# Patient Record
Sex: FEMALE | Race: WHITE | NOT HISPANIC OR LATINO | Employment: UNEMPLOYED | URBAN - METROPOLITAN AREA
[De-identification: names, ages, dates, MRNs, and addresses within clinical notes are randomized per-mention and may not be internally consistent; named-entity substitution may affect disease eponyms.]

---

## 2023-09-03 PROBLEM — G43.109 MIGRAINE WITH AURA AND WITHOUT STATUS MIGRAINOSUS, NOT INTRACTABLE: Status: ACTIVE | Noted: 2023-09-03

## 2023-09-03 PROBLEM — G90.A POTS (POSTURAL ORTHOSTATIC TACHYCARDIA SYNDROME): Status: ACTIVE | Noted: 2023-09-03

## 2023-09-03 PROBLEM — M54.2 CERVICALGIA OF OCCIPITO-ATLANTO-AXIAL REGION: Status: ACTIVE | Noted: 2023-09-03

## 2023-09-03 PROBLEM — H57.89 THYROID EYE DISEASE: Status: ACTIVE | Noted: 2023-09-03

## 2023-09-03 PROBLEM — H54.61 VISION LOSS OF RIGHT EYE: Status: ACTIVE | Noted: 2023-09-03

## 2023-09-03 PROBLEM — Z86.39 H/O HASHIMOTO THYROIDITIS: Status: ACTIVE | Noted: 2023-09-03

## 2023-09-03 PROBLEM — R11.0 NAUSEA: Status: ACTIVE | Noted: 2023-09-03

## 2023-09-03 PROBLEM — E07.9 THYROID EYE DISEASE: Status: ACTIVE | Noted: 2023-09-03

## 2023-09-03 PROBLEM — F32.A DEPRESSION: Status: ACTIVE | Noted: 2023-09-03

## 2023-09-03 RX ORDER — ZOLMITRIPTAN 5 MG/1
SPRAY NASAL
COMMUNITY
Start: 2016-12-27 | End: 2024-06-03 | Stop reason: ALTCHOICE

## 2023-09-03 RX ORDER — ZOLMITRIPTAN 5 MG/1
1 TABLET, FILM COATED ORAL
COMMUNITY
Start: 2018-04-25 | End: 2024-03-04 | Stop reason: WASHOUT

## 2023-09-03 RX ORDER — SUMATRIPTAN SUCCINATE 100 MG/1
1 TABLET ORAL
COMMUNITY

## 2023-09-03 RX ORDER — BUPROPION HYDROCHLORIDE 100 MG/1
1 TABLET ORAL
COMMUNITY

## 2023-09-03 RX ORDER — ESCITALOPRAM OXALATE 10 MG/1
1 TABLET ORAL
COMMUNITY
End: 2024-03-04 | Stop reason: WASHOUT

## 2023-09-03 RX ORDER — ETONOGESTREL 68 MG/1
IMPLANT SUBCUTANEOUS
COMMUNITY
End: 2024-02-19 | Stop reason: ALTCHOICE

## 2023-09-03 RX ORDER — LEVOTHYROXINE SODIUM 100 UG/1
1 TABLET ORAL DAILY
COMMUNITY

## 2023-09-03 RX ORDER — FLUOXETINE HYDROCHLORIDE 20 MG/1
40 CAPSULE ORAL DAILY
COMMUNITY

## 2023-09-03 RX ORDER — DICLOFENAC POTASSIUM 50 MG/1
POWDER, FOR SOLUTION ORAL
COMMUNITY
Start: 2016-12-27 | End: 2024-03-04 | Stop reason: WASHOUT

## 2023-09-03 RX ORDER — CYCLOSPORINE 0.5 MG/ML
EMULSION OPHTHALMIC
COMMUNITY
End: 2024-05-22 | Stop reason: ALTCHOICE

## 2023-10-19 ENCOUNTER — APPOINTMENT (OUTPATIENT)
Dept: OPHTHALMOLOGY | Facility: CLINIC | Age: 28
End: 2023-10-19
Payer: COMMERCIAL

## 2023-11-02 ENCOUNTER — OFFICE VISIT (OUTPATIENT)
Dept: NEUROLOGY | Facility: CLINIC | Age: 28
End: 2023-11-02
Payer: COMMERCIAL

## 2023-11-02 VITALS
HEIGHT: 68 IN | WEIGHT: 126 LBS | BODY MASS INDEX: 19.1 KG/M2 | SYSTOLIC BLOOD PRESSURE: 109 MMHG | DIASTOLIC BLOOD PRESSURE: 75 MMHG | HEART RATE: 74 BPM

## 2023-11-02 DIAGNOSIS — G90.A POTS (POSTURAL ORTHOSTATIC TACHYCARDIA SYNDROME): Primary | ICD-10-CM

## 2023-11-02 PROCEDURE — 99204 OFFICE O/P NEW MOD 45 MIN: CPT | Performed by: NURSE PRACTITIONER

## 2023-11-02 PROCEDURE — 1036F TOBACCO NON-USER: CPT | Performed by: NURSE PRACTITIONER

## 2023-11-02 RX ORDER — WEIGH SCALE
1 MISCELLANEOUS MISCELLANEOUS 2 TIMES WEEKLY
Qty: 1 EACH | Refills: 0 | Status: SHIPPED | OUTPATIENT
Start: 2023-11-02 | End: 2024-06-03 | Stop reason: ALTCHOICE

## 2023-11-02 RX ORDER — NAPROXEN 500 MG/1
500 TABLET ORAL 2 TIMES DAILY PRN
COMMUNITY

## 2023-11-02 RX ORDER — SPIRONOLACTONE 100 MG/1
100 TABLET, FILM COATED ORAL DAILY
COMMUNITY

## 2023-11-02 RX ORDER — PROPRANOLOL HYDROCHLORIDE 10 MG/1
10 TABLET ORAL 2 TIMES DAILY
Qty: 30 TABLET | Refills: 1 | Status: SHIPPED | OUTPATIENT
Start: 2023-11-02 | End: 2023-12-11 | Stop reason: SDUPTHER

## 2023-11-02 ASSESSMENT — ENCOUNTER SYMPTOMS
LOSS OF SENSATION IN FEET: 0
DEPRESSION: 0
OCCASIONAL FEELINGS OF UNSTEADINESS: 0

## 2023-11-02 ASSESSMENT — PATIENT HEALTH QUESTIONNAIRE - PHQ9
1. LITTLE INTEREST OR PLEASURE IN DOING THINGS: NOT AT ALL
2. FEELING DOWN, DEPRESSED OR HOPELESS: NOT AT ALL
SUM OF ALL RESPONSES TO PHQ9 QUESTIONS 1 AND 2: 0

## 2023-11-02 NOTE — PROGRESS NOTES
"Being assessed today for initial evaluation of POTS.  The patient has been experiencing episodes of syncope with collapse, postural dizziness for almost 3 years.  She has been following with her PCP who recently had autonomic testing completed.  This did confirm a diagnosis of POTS.  Discussed \"simple measures\" patient can take to help alleviate some her for her symptoms such as staying hydrated with noncaffeinated fluids, electrolyte replacement fluids, avoiding chocolate and caffeine, eating smaller more frequent meals, if not contraindicated for any other medical condition increase salt intake, avoiding stressors, wearing knee-high compression stockings when anticipating extended periods of weightbearing activities outside of the home.  Patient to keep orthostatic diary for review at follow-up.  Patient did try midodrine in the past and was unable to tolerate it due to side effects.  Reviewed the process of obtaining this data and patient verbalized understanding.  We will try her on propranolol 10 mg daily for 7 days then increase to twice daily.  Discussed role of medicine, importance of taking medications, potential risks, benefits, and precautions to be taken.  Reviewed sleep hygiene and dietary modifications.  Follow-up in 4 to 6 weeks.    This note was created with voice recognition software and was not corrected for typographical or grammatical errors  "

## 2023-11-27 ENCOUNTER — APPOINTMENT (OUTPATIENT)
Dept: DERMATOLOGY | Facility: CLINIC | Age: 28
End: 2023-11-27
Payer: COMMERCIAL

## 2023-11-28 ENCOUNTER — APPOINTMENT (OUTPATIENT)
Dept: DERMATOLOGY | Facility: CLINIC | Age: 28
End: 2023-11-28
Payer: COMMERCIAL

## 2023-12-11 ENCOUNTER — TELEMEDICINE (OUTPATIENT)
Dept: NEUROLOGY | Facility: CLINIC | Age: 28
End: 2023-12-11
Payer: COMMERCIAL

## 2023-12-11 DIAGNOSIS — G90.A POTS (POSTURAL ORTHOSTATIC TACHYCARDIA SYNDROME): Primary | ICD-10-CM

## 2023-12-11 PROCEDURE — 99214 OFFICE O/P EST MOD 30 MIN: CPT | Performed by: NURSE PRACTITIONER

## 2023-12-11 RX ORDER — PROPRANOLOL HYDROCHLORIDE 10 MG/1
10 TABLET ORAL DAILY
Qty: 90 TABLET | Refills: 1 | Status: SHIPPED | OUTPATIENT
Start: 2023-12-11 | End: 2024-03-04 | Stop reason: ALTCHOICE

## 2023-12-11 NOTE — PROGRESS NOTES
Patient being assessed today for follow-up of POTS.  She reports that she is only taking the propranolol once a day.  With that her symptoms have significantly improved and have become more manageable.  She reports that she has decreased episodes of tachycardia and does not feel like she is going to lose consciousness as frequently as previous.  Denies side effects.  Orthostatics stable.  Would like to continue the propranolol 1 time per day as she has been taking it.  Discussed role of medicine, importance of taking medications, potential risks, benefits, and precautions to be taken.  Reviewed sleep hygiene and dietary modifications.  Follow-up in 6 months.    This note was created with voice recognition software and was not corrected for typographical or grammatical errors

## 2023-12-21 ENCOUNTER — PREP FOR PROCEDURE (OUTPATIENT)
Dept: OTOLARYNGOLOGY | Facility: CLINIC | Age: 28
End: 2023-12-21
Payer: COMMERCIAL

## 2023-12-21 ENCOUNTER — HOSPITAL ENCOUNTER (OUTPATIENT)
Facility: CLINIC | Age: 28
Setting detail: OUTPATIENT SURGERY
End: 2023-12-21
Attending: OTOLARYNGOLOGY | Admitting: OTOLARYNGOLOGY
Payer: COMMERCIAL

## 2023-12-21 DIAGNOSIS — J34.2 DEVIATED NASAL SEPTUM: ICD-10-CM

## 2023-12-21 DIAGNOSIS — J34.3 HYPERTROPHY OF NASAL TURBINATES: ICD-10-CM

## 2023-12-21 DIAGNOSIS — M95.0 ACQUIRED DEFORMITY OF NOSE: ICD-10-CM

## 2024-02-19 PROBLEM — R06.89 DIFFICULTY BREATHING: Status: ACTIVE | Noted: 2024-02-19

## 2024-02-19 PROBLEM — J34.2 DEVIATED SEPTUM: Status: ACTIVE | Noted: 2024-02-19

## 2024-02-19 PROBLEM — M95.0 NASAL VALVE COLLAPSE: Status: ACTIVE | Noted: 2024-02-19

## 2024-02-19 PROBLEM — J34.829 NASAL VALVE COLLAPSE: Status: ACTIVE | Noted: 2024-02-19

## 2024-02-19 NOTE — PROGRESS NOTES
[facility]    3/4/24: Sasha Ceballos is a 28yo female known to our office, who was previously seen September 2023 for Nasal surgery consultation.  She presents today because she has been dealing with chronic sinus infection since November 2023 despite 3 rounds of abx rx by PCP/urgent care. Would also like discuss surgical planning, possible combination with FESS.       Reason for consult: Nasal obstruction     Referring provider: Self referred, but previously seen with Dr. Mcwilliams for vision/proptosis.      Chief Complaint: Obstructed breathing     Constant, present year round, does not fluctuate. It affects the patients ability to sleep, exercise, and is troubling during the day.  Symptoms began: Lifelong      PATIENT HAD FILLER TO THE UPPER BRIDGE OF THE NOSE 3 MONTHS AGO.     Nasal steroid or spray:   Site of obstruction: L>R  Previous Otolaryngology Provider: No  Previous documentation for this patient was extensively reviewed including specific reasons for evaluation. Complex nature of complaint and medical issues prompting evaluation for surgical consideration by facial plastic & reconstructive surgeon.  Previous surgery: No  Previous CT/MRI imaging of the nasal cavity and sinuses: No  Trauma history: No  Sleep apnea or snoring history: No  Allergic rhinitis, sinusitis history: Seasonal  Smoking: No  Aesthetic concern: Yes     Past, family, and social history obtained but not pertinent to current problem other than documented in HPI:     All other systems have been reviewed and are negative for complaint.     Physical exam     General: Well-developed and well-nourished in appearance.  Skin: No rashes or concerning lesions on the visible portions of the skin.  Eyes: Extraocular movements intact. Visual fields grossly normal.  Ears: Pinna are normal in shape and position. External canals are patent.  Oral Cavity/Oropharynx: Dentition is intact. Mucous membranes moist. No masses or lesions. Tonsils are symmetric  and non-obstructive.  Neck: Midline trachea without masses or lesions. Thyroid is normal in size.  Lymphatics: No palpable cervical lymphadenopathy  Respiratory: No respiratory distress. Quiet breathing without stertor or stridor.  Cardiovascular: Regular rate and rhythm. Warm extremities with equal pulses.  Psych: Normal mood and affect. Judgement and insight appropriate.  Neuro: Alert and oriented. CN II-XII grossly intact. No focal deficits.  Musculoskeletal: Gait intact. Moves all extremities well without apparent deformities.     A comprehensive facial exam was performed with the following highlights:  Nasal skin type: Thick  External exam:  Tip: bulbous  Tip rotation: appropriate  Projection: overprojection  Dorsum:   Base width: Appropriate  Asymmetries:   Alar columellar relationship: Increased alar show bilaterally  Internal exam:   Septum: Left C-shaped deviation; Left caudal deflection  Inferior turbinates: hypertrophic BL  Nasal valve angle: Decreased BL     Intranasal examination performed with limited view on anterior rhinoscopy.     Procedures:  Procedure: Rigid diagnostic nasal endoscopy  Surgeon: Marlon Rodriguez MD  Anesthesia: None  Timeout: Performed  Findings: A 0-degree rigid endoscope was passed through the patient's bilateral naris. The first pass was along the floor of the nose to the nasopharynx. The second pass was to the area of the middle meatus. The third pass was to the sphenoethmoidal recess.     Septum: Deviation is S shaped with bilateral obstruction approximately 90% without perforations nor synechia.  Internal Nasal Valve: Angle is reduced, narrowing the nasal airway bilaterally.     Right nasal cavity:  Inferior turbinate: 2+  Inferior meatus: Clear, no discharge, no polyps/masses/lesions  Middle meatus: Clear, no discharge, no polyps/masses/lesions     Left nasal cavity:  Inferior turbinate: 2+  Inferior meatus: Clear, no discharge, no polyps/masses/lesions  Middle meatus: Clear,  no discharge, no polyps/masses/lesions  Nasopharynx: Clear, no discharge, no masses/lesions     Modified Elvira Maneuver: Performed with a cotton tipped applicator, markedly improves breathing bilaterally.    Assessment - This patient has a significant mechanical nasal obstruction. The cause is multifactorial, with an obvious septal deviation, turbinate hypertrophy, and static internal nasal valve collapse. There is some dynamic nasal valve collapse as well. Correction of this nasal obstruction will require a septoplasty, repair of nasal valve collapse with structural grafting, and a bilateral turbinate reduction. We discussed the medical necessity of this at length, as well as the risks and limitations of the procedures. All questions were answered.      Plan - Recommend septoplasty, nasal valve repair with structural grafting, and inferior turbinate reduction with lateralization. We also discussed the aesthetic concerns as well.     Persistent sinusitis symptoms despite antibiotic course x 3. Recommend CT Sinus w/o contrast to further evaluate.

## 2024-02-22 ENCOUNTER — OFFICE VISIT (OUTPATIENT)
Dept: DERMATOLOGY | Facility: CLINIC | Age: 29
End: 2024-02-22
Payer: COMMERCIAL

## 2024-02-22 DIAGNOSIS — L70.0 ACNE VULGARIS: Primary | ICD-10-CM

## 2024-02-22 DIAGNOSIS — L91.0 KELOID: ICD-10-CM

## 2024-02-22 PROCEDURE — 1036F TOBACCO NON-USER: CPT | Performed by: NURSE PRACTITIONER

## 2024-02-22 PROCEDURE — 99204 OFFICE O/P NEW MOD 45 MIN: CPT | Performed by: NURSE PRACTITIONER

## 2024-02-22 RX ORDER — BENZOYL PEROXIDE 100 MG/ML
1 LIQUID TOPICAL DAILY
Qty: 237 G | Refills: 11 | Status: SHIPPED | OUTPATIENT
Start: 2024-02-22 | End: 2024-03-04 | Stop reason: ALTCHOICE

## 2024-02-22 RX ORDER — CLINDAMYCIN PHOSPHATE 10 UG/ML
LOTION TOPICAL 2 TIMES DAILY
Qty: 60 ML | Refills: 1 | Status: SHIPPED | OUTPATIENT
Start: 2024-02-22 | End: 2024-03-04 | Stop reason: ALTCHOICE

## 2024-02-22 NOTE — PROGRESS NOTES
Subjective     Sasha Ceballos is a 29 y.o. female who presents for the following: Acne, keloid, and lesion.     New patient visit in for acne to face patient currently treating with spironolactone 100 mg and tazarotene.  Patient states that it is helpful she is been using this has routine for 1 month but wonders if there is anything else she should be using.    Patient would like to look at keloid present to chest x 10 years patient describes it as painful and borders changing patient has had in the past laser treatment and IL K injections.    Patient would like provider to take a look at new lesion on chest near keloid that is painful and present x 2 months    Review of Systems:  No other skin or systemic complaints other than what is documented elsewhere in the note.    The following portions of the chart were reviewed this encounter and updated as appropriate:       Skin Cancer History  No skin cancer on file.    Specialty Problems    None    Past Medical History:  Sasha Ceballos  has a past medical history of Cervicalgia (12/27/2016), Migraine with aura, not intractable, without status migrainosus (12/27/2016), Personal history of other diseases of the respiratory system, Personal history of other endocrine, nutritional and metabolic disease (09/15/2022), and Personal history of other specified conditions.    Past Surgical History:  Sasha Ceballos  has a past surgical history that includes Foot surgery (12/27/2016); Other surgical history (09/15/2022); CT angio head w and wo IV contrast (1/20/2023); and CT angio neck (1/20/2023).    Family History:  Patient family history includes Cancer in an other family member; Thyroid cancer in an other family member.    Social History:  Sasha Ceballos  reports that she has never smoked. She has never used smokeless tobacco. She reports that she does not drink alcohol and does not use drugs.    Allergies:  Latex and Oxycodone-acetaminophen    Current Medications / CAM's:     Current Outpatient Medications:     benzoyl peroxide (Benzac AC) 10 % external wash, Apply 1 Application topically once daily., Disp: 237 g, Rfl: 11    blood pressure test kit-small kit, 1 each 2 times a week., Disp: 1 each, Rfl: 0    buPROPion (Wellbutrin) 100 mg tablet, Take 1 tablet (100 mg) by mouth. As directed., Disp: , Rfl:     clindamycin (Cleocin T) 1 % lotion, Apply topically 2 times a day., Disp: 60 mL, Rfl: 1    cycloSPORINE (Restasis MultiDose) 0.05 % drops, Administer into affected eye(s). Take as directed., Disp: , Rfl:     diclofenac potassium (Cambia) 50 mg powder in packet, Take by mouth. 1 to2 packets prn, Disp: , Rfl:     escitalopram (Lexapro) 10 mg tablet, Take 1 tablet (10 mg) by mouth. As directed., Disp: , Rfl:     FLUoxetine (PROzac) 20 mg capsule, Take 1 capsule (20 mg) by mouth once daily., Disp: , Rfl:     levothyroxine (Synthroid, Levoxyl) 100 mcg tablet, Take 1 tablet (100 mcg) by mouth once daily., Disp: , Rfl:     naproxen (Naprosyn) 500 mg tablet, Take 1 tablet (500 mg) by mouth 2 times a day as needed for mild pain (1 - 3)., Disp: , Rfl:     propranolol (Inderal) 10 mg tablet, Take 1 tablet (10 mg) by mouth once daily., Disp: 90 tablet, Rfl: 1    spironolactone (Aldactone) 100 mg tablet, Take 1 tablet (100 mg) by mouth once daily., Disp: , Rfl:     SUMAtriptan (Imitrex) 100 mg tablet, Take 1 tablet (100 mg) by mouth. FOR MIGRAINE RELIEF. MAY REPEAT 2 HOURS LATER. MAXIMUM 200MG/DAY., Disp: , Rfl:     ZOLMitriptan (Zomig) 5 mg nasal solution, Administer into affected nostril(s). 1 to 2 sprays qd prn, Disp: , Rfl:     ZOLMitriptan (Zomig) 5 mg tablet, Take 1 tablet (5 mg) by mouth. AT ONSET OF MIGRAINE. MAY REPEAT 1 TIME AFTER 2 HOURS. MAX 10 MG PER DAY, Disp: , Rfl:      Objective   Well appearing patient in no apparent distress; mood and affect are within normal limits.    Assessment/Plan   1. Acne vulgaris  Head - Anterior (Face)  Scattered pink papules    Maintenance of Current  Regimen: Please continue using the prescribed topical medications as directed. Consistency is key to maintaining the improvement achieved so far.    PLAN:  Start BPO 10% wash daily  Start clindamycin 1% lotion  Continue spironolactone 100mg daily  Continue tazorac 0.1% cream       Further Treatment Options: If there are no significant improvements or if your acne worsens, we may consider additional treatment options such as oral medications or in-office procedures. However, it is important to note that most cases of mild acne can be managed effectively with the current regimen.    Skin Care Tips: Continue following a gentle cleansing routine, avoiding harsh scrubbing, and using non-comedogenic moisturizers to keep your skin hydrated without clogging pores.    Cleansing Routine: Gentle Cleanser: You have been using a mild, non-comedogenic cleanser to wash your face twice daily. This practice helps to remove excess oil, dirt, and impurities from your skin, minimizing the risk of pore blockage.    Lifestyle Measures: Avoiding Trigger Factors: We also discussed avoiding known triggers such as excessive sun exposure, touching or picking at the acne lesions, and using heavy or comedogenic cosmetic products.    Please feel free to contact our clinic if you have any questions or concerns between appointments. Remember, acne treatment requires patience and consistency.                 clindamycin (Cleocin T) 1 % lotion - Head - Anterior (Face)  Apply topically 2 times a day.    benzoyl peroxide (Benzac AC) 10 % external wash - Head - Anterior (Face)  Apply 1 Application topically once daily.    2. Keloid  Chest - Medial (Center)  4cm Shiny, thick, fibrotic pink-brown plaque.    Intralesional injection - Chest - Medial (Center)  Intralesional Injection:   Consent:     Consent obtained:  Verbal    Consent given by:  Patient    Risks discussed:  Poor cosmetic result, pain, infection and bleeding    Alternatives discussed:  No  treatment  Pre-Procedure Details:     Prep Type:  Isopropyl alcohol  Procedure Details:   Injection:  Triamcinolone  Outcome: patient tolerated procedure well  Post-procedure details: sterile dressing applied and wound care instructions given  Dressing type: bandage   Comments:  A total of 0.5 ml of 40 mg/mL Kenalog were injected into 1 lesion(s)

## 2024-03-04 ENCOUNTER — OFFICE VISIT (OUTPATIENT)
Dept: OTOLARYNGOLOGY | Facility: CLINIC | Age: 29
End: 2024-03-04
Payer: COMMERCIAL

## 2024-03-04 VITALS — WEIGHT: 128.4 LBS | HEIGHT: 68 IN | BODY MASS INDEX: 19.46 KG/M2

## 2024-03-04 DIAGNOSIS — J34.3 HYPERTROPHY OF NASAL TURBINATES: ICD-10-CM

## 2024-03-04 DIAGNOSIS — J01.91 ACUTE RECURRENT SINUSITIS, UNSPECIFIED LOCATION: Primary | ICD-10-CM

## 2024-03-04 DIAGNOSIS — M95.0 NASAL VALVE COLLAPSE: ICD-10-CM

## 2024-03-04 DIAGNOSIS — J34.2 DEVIATED SEPTUM: ICD-10-CM

## 2024-03-04 DIAGNOSIS — R06.89 DIFFICULTY BREATHING: ICD-10-CM

## 2024-03-04 PROCEDURE — 31231 NASAL ENDOSCOPY DX: CPT | Performed by: OTOLARYNGOLOGY

## 2024-03-04 PROCEDURE — 1036F TOBACCO NON-USER: CPT | Performed by: OTOLARYNGOLOGY

## 2024-03-04 PROCEDURE — 99213 OFFICE O/P EST LOW 20 MIN: CPT | Performed by: OTOLARYNGOLOGY

## 2024-03-04 RX ORDER — DOXYCYCLINE 100 MG/1
100 CAPSULE ORAL 2 TIMES DAILY
Qty: 28 CAPSULE | Refills: 0 | Status: SHIPPED | OUTPATIENT
Start: 2024-03-04 | End: 2024-03-04 | Stop reason: ENTERED-IN-ERROR

## 2024-03-04 ASSESSMENT — PATIENT HEALTH QUESTIONNAIRE - PHQ9
1. LITTLE INTEREST OR PLEASURE IN DOING THINGS: NOT AT ALL
2. FEELING DOWN, DEPRESSED OR HOPELESS: NOT AT ALL
SUM OF ALL RESPONSES TO PHQ9 QUESTIONS 1 & 2: 0

## 2024-03-04 ASSESSMENT — ENCOUNTER SYMPTOMS
OCCASIONAL FEELINGS OF UNSTEADINESS: 0
LOSS OF SENSATION IN FEET: 0
DEPRESSION: 0

## 2024-03-18 ENCOUNTER — HOSPITAL ENCOUNTER (OUTPATIENT)
Dept: RADIOLOGY | Facility: CLINIC | Age: 29
Discharge: HOME | End: 2024-03-18
Payer: COMMERCIAL

## 2024-03-18 DIAGNOSIS — J01.91 ACUTE RECURRENT SINUSITIS, UNSPECIFIED LOCATION: ICD-10-CM

## 2024-03-18 PROCEDURE — 70486 CT MAXILLOFACIAL W/O DYE: CPT

## 2024-03-28 ENCOUNTER — PREP FOR PROCEDURE (OUTPATIENT)
Dept: OTOLARYNGOLOGY | Facility: CLINIC | Age: 29
End: 2024-03-28
Payer: COMMERCIAL

## 2024-03-28 DIAGNOSIS — M95.0 ACQUIRED DEFORMITY OF NOSE: ICD-10-CM

## 2024-03-28 DIAGNOSIS — J34.3 HYPERTROPHY OF NASAL TURBINATES: ICD-10-CM

## 2024-03-28 DIAGNOSIS — J34.2 DEVIATED NASAL SEPTUM: ICD-10-CM

## 2024-04-04 ENCOUNTER — ANESTHESIA EVENT (OUTPATIENT)
Dept: OPERATING ROOM | Facility: CLINIC | Age: 29
End: 2024-04-04
Payer: COMMERCIAL

## 2024-04-05 ENCOUNTER — HOSPITAL ENCOUNTER (OUTPATIENT)
Facility: CLINIC | Age: 29
Setting detail: OUTPATIENT SURGERY
Discharge: HOME | End: 2024-04-05
Attending: OTOLARYNGOLOGY | Admitting: OTOLARYNGOLOGY
Payer: COMMERCIAL

## 2024-04-05 ENCOUNTER — ANESTHESIA (OUTPATIENT)
Dept: OPERATING ROOM | Facility: CLINIC | Age: 29
End: 2024-04-05
Payer: COMMERCIAL

## 2024-04-05 VITALS
WEIGHT: 128.09 LBS | OXYGEN SATURATION: 98 % | DIASTOLIC BLOOD PRESSURE: 73 MMHG | HEART RATE: 84 BPM | RESPIRATION RATE: 16 BRPM | HEIGHT: 68 IN | SYSTOLIC BLOOD PRESSURE: 119 MMHG | TEMPERATURE: 98.2 F | BODY MASS INDEX: 19.41 KG/M2

## 2024-04-05 DIAGNOSIS — R06.89 DIFFICULTY BREATHING: Primary | ICD-10-CM

## 2024-04-05 DIAGNOSIS — J34.2 DEVIATED NASAL SEPTUM: ICD-10-CM

## 2024-04-05 DIAGNOSIS — J34.3 HYPERTROPHY OF NASAL TURBINATES: ICD-10-CM

## 2024-04-05 DIAGNOSIS — M95.0 ACQUIRED DEFORMITY OF NOSE: ICD-10-CM

## 2024-04-05 DIAGNOSIS — G89.18 POSTOPERATIVE PAIN: ICD-10-CM

## 2024-04-05 LAB — PREGNANCY TEST URINE, POC: NEGATIVE

## 2024-04-05 PROCEDURE — 3700000002 HC GENERAL ANESTHESIA TIME - EACH INCREMENTAL 1 MINUTE: Mod: CSM | Performed by: OTOLARYNGOLOGY

## 2024-04-05 PROCEDURE — 81025 URINE PREGNANCY TEST: CPT | Performed by: OTOLARYNGOLOGY

## 2024-04-05 PROCEDURE — 7100000010 HC PHASE TWO TIME - EACH INCREMENTAL 1 MINUTE: Performed by: OTOLARYNGOLOGY

## 2024-04-05 PROCEDURE — COSRE: Performed by: OTOLARYNGOLOGY

## 2024-04-05 PROCEDURE — 30520 REPAIR OF NASAL SEPTUM: CPT | Performed by: OTOLARYNGOLOGY

## 2024-04-05 PROCEDURE — 30465 REPAIR NASAL STENOSIS: CPT | Performed by: OTOLARYNGOLOGY

## 2024-04-05 PROCEDURE — 2500000004 HC RX 250 GENERAL PHARMACY W/ HCPCS (ALT 636 FOR OP/ED): Performed by: OTOLARYNGOLOGY

## 2024-04-05 PROCEDURE — 2500000001 HC RX 250 WO HCPCS SELF ADMINISTERED DRUGS (ALT 637 FOR MEDICARE OP): Performed by: ANESTHESIOLOGY

## 2024-04-05 PROCEDURE — 2500000005 HC RX 250 GENERAL PHARMACY W/O HCPCS: Performed by: OTOLARYNGOLOGY

## 2024-04-05 PROCEDURE — 3700000001 HC GENERAL ANESTHESIA TIME - INITIAL BASE CHARGE: Performed by: OTOLARYNGOLOGY

## 2024-04-05 PROCEDURE — A4217 STERILE WATER/SALINE, 500 ML: HCPCS | Performed by: OTOLARYNGOLOGY

## 2024-04-05 PROCEDURE — 2500000001 HC RX 250 WO HCPCS SELF ADMINISTERED DRUGS (ALT 637 FOR MEDICARE OP): Performed by: OTOLARYNGOLOGY

## 2024-04-05 PROCEDURE — 2500000004 HC RX 250 GENERAL PHARMACY W/ HCPCS (ALT 636 FOR OP/ED)

## 2024-04-05 PROCEDURE — 3600000002 HC OR TIME - INITIAL BASE CHARGE - PROCEDURE LEVEL TWO: Performed by: OTOLARYNGOLOGY

## 2024-04-05 PROCEDURE — 2720000007 HC OR 272 NO HCPCS: Performed by: OTOLARYNGOLOGY

## 2024-04-05 PROCEDURE — 2500000001 HC RX 250 WO HCPCS SELF ADMINISTERED DRUGS (ALT 637 FOR MEDICARE OP)

## 2024-04-05 PROCEDURE — 7100000009 HC PHASE TWO TIME - INITIAL BASE CHARGE: Performed by: OTOLARYNGOLOGY

## 2024-04-05 PROCEDURE — 7100000002 HC RECOVERY ROOM TIME - EACH INCREMENTAL 1 MINUTE: Performed by: OTOLARYNGOLOGY

## 2024-04-05 PROCEDURE — 30802 ABLATE INF TURBINATE SUBMUC: CPT | Performed by: OTOLARYNGOLOGY

## 2024-04-05 PROCEDURE — 3600000007 HC OR TIME - EACH INCREMENTAL 1 MINUTE - PROCEDURE LEVEL TWO: Mod: CSM | Performed by: OTOLARYNGOLOGY

## 2024-04-05 PROCEDURE — 2500000005 HC RX 250 GENERAL PHARMACY W/O HCPCS

## 2024-04-05 PROCEDURE — 7100000001 HC RECOVERY ROOM TIME - INITIAL BASE CHARGE: Performed by: OTOLARYNGOLOGY

## 2024-04-05 RX ORDER — PROPRANOLOL HYDROCHLORIDE 10 MG/1
10 TABLET ORAL 2 TIMES DAILY
COMMUNITY

## 2024-04-05 RX ORDER — PROPOFOL 10 MG/ML
INJECTION, EMULSION INTRAVENOUS CONTINUOUS PRN
Status: DISCONTINUED | OUTPATIENT
Start: 2024-04-05 | End: 2024-04-05

## 2024-04-05 RX ORDER — SODIUM CHLORIDE 0.9 G/100ML
INJECTION, SOLUTION IRRIGATION AS NEEDED
Status: DISCONTINUED | OUTPATIENT
Start: 2024-04-05 | End: 2024-04-05 | Stop reason: HOSPADM

## 2024-04-05 RX ORDER — OXYMETAZOLINE HCL 0.05 %
SPRAY, NON-AEROSOL (ML) NASAL AS NEEDED
Status: DISCONTINUED | OUTPATIENT
Start: 2024-04-05 | End: 2024-04-05 | Stop reason: HOSPADM

## 2024-04-05 RX ORDER — SUCCINYLCHOLINE CHLORIDE 20 MG/ML
INJECTION INTRAMUSCULAR; INTRAVENOUS AS NEEDED
Status: DISCONTINUED | OUTPATIENT
Start: 2024-04-05 | End: 2024-04-05

## 2024-04-05 RX ORDER — ONDANSETRON HYDROCHLORIDE 2 MG/ML
4 INJECTION, SOLUTION INTRAVENOUS ONCE AS NEEDED
Status: DISCONTINUED | OUTPATIENT
Start: 2024-04-05 | End: 2024-04-05 | Stop reason: HOSPADM

## 2024-04-05 RX ORDER — LIDOCAINE HYDROCHLORIDE 20 MG/ML
INJECTION, SOLUTION INFILTRATION; PERINEURAL AS NEEDED
Status: DISCONTINUED | OUTPATIENT
Start: 2024-04-05 | End: 2024-04-05

## 2024-04-05 RX ORDER — FENTANYL CITRATE 50 UG/ML
INJECTION, SOLUTION INTRAMUSCULAR; INTRAVENOUS AS NEEDED
Status: DISCONTINUED | OUTPATIENT
Start: 2024-04-05 | End: 2024-04-05

## 2024-04-05 RX ORDER — SCOLOPAMINE TRANSDERMAL SYSTEM 1 MG/1
PATCH, EXTENDED RELEASE TRANSDERMAL AS NEEDED
Status: DISCONTINUED | OUTPATIENT
Start: 2024-04-05 | End: 2024-04-05

## 2024-04-05 RX ORDER — ONDANSETRON 4 MG/1
8 TABLET, FILM COATED ORAL EVERY 8 HOURS PRN
Qty: 20 TABLET | Refills: 0 | Status: SHIPPED | OUTPATIENT
Start: 2024-04-05 | End: 2024-06-03 | Stop reason: ALTCHOICE

## 2024-04-05 RX ORDER — TRAMADOL HYDROCHLORIDE 50 MG/1
50 TABLET ORAL EVERY 6 HOURS PRN
Qty: 16 TABLET | Refills: 0 | Status: SHIPPED | OUTPATIENT
Start: 2024-04-05 | End: 2024-04-09

## 2024-04-05 RX ORDER — GLYCOPYRROLATE 0.2 MG/ML
INJECTION INTRAMUSCULAR; INTRAVENOUS AS NEEDED
Status: DISCONTINUED | OUTPATIENT
Start: 2024-04-05 | End: 2024-04-05

## 2024-04-05 RX ORDER — SODIUM CHLORIDE, SODIUM LACTATE, POTASSIUM CHLORIDE, CALCIUM CHLORIDE 600; 310; 30; 20 MG/100ML; MG/100ML; MG/100ML; MG/100ML
INJECTION, SOLUTION INTRAVENOUS CONTINUOUS PRN
Status: DISCONTINUED | OUTPATIENT
Start: 2024-04-05 | End: 2024-04-05

## 2024-04-05 RX ORDER — LIDOCAINE IN NACL,ISO-OSMOT/PF 30 MG/3 ML
0.1 SYRINGE (ML) INJECTION ONCE
Status: DISCONTINUED | OUTPATIENT
Start: 2024-04-05 | End: 2024-04-05 | Stop reason: HOSPADM

## 2024-04-05 RX ORDER — CELECOXIB 200 MG/1
CAPSULE ORAL AS NEEDED
Status: DISCONTINUED | OUTPATIENT
Start: 2024-04-05 | End: 2024-04-05

## 2024-04-05 RX ORDER — HYDROMORPHONE HYDROCHLORIDE 0.2 MG/ML
0.2 INJECTION INTRAMUSCULAR; INTRAVENOUS; SUBCUTANEOUS EVERY 5 MIN PRN
Status: DISCONTINUED | OUTPATIENT
Start: 2024-04-05 | End: 2024-04-05 | Stop reason: HOSPADM

## 2024-04-05 RX ORDER — TRAMADOL HYDROCHLORIDE 50 MG/1
50 TABLET ORAL ONCE
Status: COMPLETED | OUTPATIENT
Start: 2024-04-05 | End: 2024-04-05

## 2024-04-05 RX ORDER — ONDANSETRON HYDROCHLORIDE 2 MG/ML
INJECTION, SOLUTION INTRAVENOUS AS NEEDED
Status: DISCONTINUED | OUTPATIENT
Start: 2024-04-05 | End: 2024-04-05

## 2024-04-05 RX ORDER — DEXAMETHASONE SODIUM PHOSPHATE 100 MG/10ML
INJECTION INTRAMUSCULAR; INTRAVENOUS AS NEEDED
Status: DISCONTINUED | OUTPATIENT
Start: 2024-04-05 | End: 2024-04-05

## 2024-04-05 RX ORDER — PROPOFOL 10 MG/ML
INJECTION, EMULSION INTRAVENOUS AS NEEDED
Status: DISCONTINUED | OUTPATIENT
Start: 2024-04-05 | End: 2024-04-05

## 2024-04-05 RX ORDER — MUPIROCIN 20 MG/G
OINTMENT TOPICAL AS NEEDED
Status: DISCONTINUED | OUTPATIENT
Start: 2024-04-05 | End: 2024-04-05 | Stop reason: HOSPADM

## 2024-04-05 RX ORDER — CEFAZOLIN 1 G/1
INJECTION, POWDER, FOR SOLUTION INTRAVENOUS AS NEEDED
Status: DISCONTINUED | OUTPATIENT
Start: 2024-04-05 | End: 2024-04-05

## 2024-04-05 RX ORDER — SODIUM CHLORIDE, SODIUM LACTATE, POTASSIUM CHLORIDE, CALCIUM CHLORIDE 600; 310; 30; 20 MG/100ML; MG/100ML; MG/100ML; MG/100ML
100 INJECTION, SOLUTION INTRAVENOUS CONTINUOUS
Status: DISCONTINUED | OUTPATIENT
Start: 2024-04-05 | End: 2024-04-05 | Stop reason: HOSPADM

## 2024-04-05 RX ORDER — MUPIROCIN 20 MG/G
OINTMENT TOPICAL
Qty: 30 G | Refills: 0 | Status: SHIPPED | OUTPATIENT
Start: 2024-04-05 | End: 2024-06-03 | Stop reason: ALTCHOICE

## 2024-04-05 RX ORDER — LIDOCAINE HYDROCHLORIDE 40 MG/ML
SOLUTION TOPICAL AS NEEDED
Status: DISCONTINUED | OUTPATIENT
Start: 2024-04-05 | End: 2024-04-05

## 2024-04-05 RX ORDER — EPINEPHRINE 1 MG/ML
INJECTION, SOLUTION, CONCENTRATE INTRAVENOUS AS NEEDED
Status: DISCONTINUED | OUTPATIENT
Start: 2024-04-05 | End: 2024-04-05 | Stop reason: HOSPADM

## 2024-04-05 RX ORDER — MIDAZOLAM HYDROCHLORIDE 1 MG/ML
INJECTION, SOLUTION INTRAMUSCULAR; INTRAVENOUS AS NEEDED
Status: DISCONTINUED | OUTPATIENT
Start: 2024-04-05 | End: 2024-04-05

## 2024-04-05 RX ORDER — LIDOCAINE HYDROCHLORIDE AND EPINEPHRINE 10; 10 UG/ML; MG/ML
INJECTION, SOLUTION INFILTRATION; PERINEURAL AS NEEDED
Status: DISCONTINUED | OUTPATIENT
Start: 2024-04-05 | End: 2024-04-05 | Stop reason: HOSPADM

## 2024-04-05 RX ORDER — CEPHALEXIN 500 MG/1
500 CAPSULE ORAL 4 TIMES DAILY
Qty: 40 CAPSULE | Refills: 0 | Status: SHIPPED | OUTPATIENT
Start: 2024-04-05 | End: 2024-04-15

## 2024-04-05 RX ORDER — TRANEXAMIC ACID 100 MG/ML
INJECTION, SOLUTION INTRAVENOUS AS NEEDED
Status: DISCONTINUED | OUTPATIENT
Start: 2024-04-05 | End: 2024-04-05 | Stop reason: HOSPADM

## 2024-04-05 RX ORDER — ALBUTEROL SULFATE 0.83 MG/ML
2.5 SOLUTION RESPIRATORY (INHALATION) ONCE AS NEEDED
Status: DISCONTINUED | OUTPATIENT
Start: 2024-04-05 | End: 2024-04-05 | Stop reason: HOSPADM

## 2024-04-05 RX ORDER — ACETAMINOPHEN 325 MG/1
TABLET ORAL AS NEEDED
Status: DISCONTINUED | OUTPATIENT
Start: 2024-04-05 | End: 2024-04-05

## 2024-04-05 RX ORDER — GABAPENTIN 300 MG/1
CAPSULE ORAL AS NEEDED
Status: DISCONTINUED | OUTPATIENT
Start: 2024-04-05 | End: 2024-04-05

## 2024-04-05 RX ADMIN — CEFAZOLIN 2 G: 1 INJECTION, POWDER, FOR SOLUTION INTRAMUSCULAR; INTRAVENOUS at 11:13

## 2024-04-05 RX ADMIN — CELECOXIB 200 MG: 200 CAPSULE ORAL at 10:53

## 2024-04-05 RX ADMIN — PROPOFOL 60 MCG/KG/MIN: 10 INJECTION, EMULSION INTRAVENOUS at 11:08

## 2024-04-05 RX ADMIN — FENTANYL CITRATE 50 MCG: 50 INJECTION, SOLUTION INTRAMUSCULAR; INTRAVENOUS at 11:05

## 2024-04-05 RX ADMIN — LIDOCAINE HYDROCHLORIDE 100 MG: 20 INJECTION, SOLUTION INFILTRATION; PERINEURAL at 11:05

## 2024-04-05 RX ADMIN — ONDANSETRON 4 MG: 2 INJECTION INTRAMUSCULAR; INTRAVENOUS at 12:52

## 2024-04-05 RX ADMIN — MIDAZOLAM 2 MG: 1 INJECTION INTRAMUSCULAR; INTRAVENOUS at 11:00

## 2024-04-05 RX ADMIN — DEXAMETHASONE SODIUM PHOSPHATE 10 MG: 10 INJECTION INTRAMUSCULAR; INTRAVENOUS at 11:13

## 2024-04-05 RX ADMIN — SUCCINYLCHOLINE CHLORIDE 60 MG: 20 INJECTION, SOLUTION INTRAMUSCULAR; INTRAVENOUS at 11:05

## 2024-04-05 RX ADMIN — SODIUM CHLORIDE, POTASSIUM CHLORIDE, SODIUM LACTATE AND CALCIUM CHLORIDE: 600; 310; 30; 20 INJECTION, SOLUTION INTRAVENOUS at 11:00

## 2024-04-05 RX ADMIN — LIDOCAINE HYDROCHLORIDE 4 ML: 40 SOLUTION TOPICAL at 11:07

## 2024-04-05 RX ADMIN — TRAMADOL HYDROCHLORIDE 50 MG: 50 TABLET, FILM COATED ORAL at 14:02

## 2024-04-05 RX ADMIN — PROPOFOL 200 MG: 10 INJECTION, EMULSION INTRAVENOUS at 11:05

## 2024-04-05 RX ADMIN — GLYCOPYRROLATE 0.1 MG: 0.2 INJECTION INTRAMUSCULAR; INTRAVENOUS at 11:38

## 2024-04-05 RX ADMIN — ACETAMINOPHEN 975 MG: 325 TABLET ORAL at 10:53

## 2024-04-05 RX ADMIN — SCOPALAMINE 1 PATCH: 1 PATCH, EXTENDED RELEASE TRANSDERMAL at 10:53

## 2024-04-05 RX ADMIN — GLYCOPYRROLATE 0.1 MG: 0.2 INJECTION INTRAMUSCULAR; INTRAVENOUS at 11:00

## 2024-04-05 RX ADMIN — FENTANYL CITRATE 50 MCG: 50 INJECTION, SOLUTION INTRAMUSCULAR; INTRAVENOUS at 12:59

## 2024-04-05 RX ADMIN — GABAPENTIN 300 MG: 300 CAPSULE ORAL at 10:53

## 2024-04-05 SDOH — HEALTH STABILITY: MENTAL HEALTH: CURRENT SMOKER: 0

## 2024-04-05 ASSESSMENT — PAIN SCALES - GENERAL
PAINLEVEL_OUTOF10: 4
PAINLEVEL_OUTOF10: 0 - NO PAIN
PAINLEVEL_OUTOF10: 2
PAINLEVEL_OUTOF10: 4
PAINLEVEL_OUTOF10: 0 - NO PAIN
PAINLEVEL_OUTOF10: 0 - NO PAIN

## 2024-04-05 ASSESSMENT — PAIN - FUNCTIONAL ASSESSMENT
PAIN_FUNCTIONAL_ASSESSMENT: 0-10

## 2024-04-05 ASSESSMENT — COLUMBIA-SUICIDE SEVERITY RATING SCALE - C-SSRS
6. HAVE YOU EVER DONE ANYTHING, STARTED TO DO ANYTHING, OR PREPARED TO DO ANYTHING TO END YOUR LIFE?: NO
1. IN THE PAST MONTH, HAVE YOU WISHED YOU WERE DEAD OR WISHED YOU COULD GO TO SLEEP AND NOT WAKE UP?: NO
2. HAVE YOU ACTUALLY HAD ANY THOUGHTS OF KILLING YOURSELF?: NO

## 2024-04-05 NOTE — H&P
History Of Present Illness  Sasha Ceballos is a 29 y.o. female presenting with nasal airway obstruction and acquired deformity of nose, presents today for nasal surgery.     Past Medical History  Past Medical History:   Diagnosis Date    Cervicalgia 12/27/2016    Cervicalgia of rrfpeeti-tloprho-afgbq region    Hypothyroidism     Migraine with aura, not intractable, without status migrainosus 12/27/2016    Migraine with aura and without status migrainosus, not intractable    Personal history of other diseases of the respiratory system     History of asthma    Personal history of other endocrine, nutritional and metabolic disease 09/15/2022    History of Hashimoto thyroiditis    Personal history of other specified conditions     History of febrile seizure    Pott's disease        Surgical History  Past Surgical History:   Procedure Laterality Date    CT ANGIO NECK  1/20/2023    CT NECK ANGIO W AND WO IV CONTRAST 1/20/2023 [facility]    CT HEAD ANGIO W AND WO IV CONTRAST  1/20/2023    CT HEAD ANGIO W AND WO IV CONTRAST 1/20/2023 [facility]CTOR [facility]    FOOT SURGERY  12/27/2016    Foot Surgery    OTHER SURGICAL HISTORY  09/15/2022    Corneal lasik        Social History  She reports that she has never smoked. She has never used smokeless tobacco. She reports that she does not drink alcohol and does not use drugs.    Family History  Family History   Problem Relation Name Age of Onset    Cancer Other Grandparent     Thyroid cancer Other Grandmother         Allergies  Oxycodone-acetaminophen and Latex    ROS negative except what is otherwise specified in the HPI.     HENT:      Head: Normocephalic and atraumatic.   Eyes:      Conjunctiva/sclera: Conjunctivae normal.      Pupils: Pupils are equal, round, and reactive to light.   Cardiovascular:      Rate and Rhythm: Normal rate and regular rhythm.   Pulmonary:      Effort: Pulmonary effort is normal. No respiratory distress.      Breath sounds: Normal breath  sounds.   Abdominal:      General: Bowel sounds are normal.      Palpations: Abdomen is soft.   Musculoskeletal:      Cervical back: Normal range of motion and neck supple.   Skin:     General: Skin is warm and dry.   Neurological:      Mental Status: Alert and oriented to person, place, and time.      Cranial Nerves: No cranial nerve deficit.      Coordination: Coordination normal.   Psychiatric:         Mood and Affect: Affect normal.       Last Recorded Vitals  There were no vitals taken for this visit.    Relevant Results         Assessment/Plan   Active Problems:    Deviated nasal septum    Hypertrophy of nasal turbinates    Acquired deformity of nose           I spent 15 minutes in the professional and overall care of this patient.      Jorge Serna PA-C

## 2024-04-05 NOTE — OP NOTE
Septoplasty, Inferior turbinate reduction, Nasal valve repair, Cosmetic rhinoplasty Operative Note     Date: 2024  OR Location: [facility] OR    Name: Sasha Ceballos, : 1995, Age: 29 y.o., MRN: [id number], Sex: female    Diagnosis  Pre-op Diagnosis     * Acquired deformity of nose [M95.0]     * Deviated nasal septum [J34.2]     * Hypertrophy of nasal turbinates [J34.3] Post-op Diagnosis     * Acquired deformity of nose [M95.0]     * Deviated nasal septum [J34.2]     * Hypertrophy of nasal turbinates [J34.3]     Procedures  Septoplasty, Inferior turbinate reduction, Nasal valve repair, Cosmetic rhinoplasty  20667 - OR SEPTOPLASTY/SUBMUCOUS RESECJ W/WO CARTILAGE GRF  20644 - OR REPAIR NASAL VESTIBULAR STENOSIS  91946/[id number] - BILATERAL TURBINATE REDUCTION    COSMETIC RHINOPLASTY  Surgeons      * Marlon Rodriguez - Primary    Resident/Fellow/Other Assistant:  Surgeon(s) and Role:     * Jorge Serna PA-C - [facility] First Assist    Procedure Summary  Anesthesia: General  ASA: ASA status not filed in the log.  Anesthesia Staff: Anesthesiologist: Thaddeus Carreon MD  C-AA: ROSE Tovar  IBETH: Aleah Krueger  Estimated Blood Loss: 10 mL  Intra-op Medications:   Administrations occurring from 1101 to 1310 on 24:   Medication Name Total Dose   oxymetazoline (Afrin) 0.05 % nasal spray 15 mL   lidocaine-epinephrine (Xylocaine W/EPI) 1 %-1:100,000 injection 10 mL   tranexamic acid (Cyklokapron) injection 1 g   balanced salts (BSS) intraocular solution 1 mL   EPINEPHrine HCl (PF) (Adrenalin) injection 2 mg   mupirocin (Bactroban) 2 % ointment 1 Application   sodium chloride 0.9 % irrigation solution 250 mL              Anesthesia Record               Intraprocedure I/O Totals          Intake    Propofol Drip 36.25 mL    The total shown is the total volume documented since Anesthesia Start was filed.    LR infusion 500.00 mL    Total Intake 536.25 mL          Specimen: No specimens collected     Staff:    Circulator: Margie Pennington RN  Relief Circulator: Shakila Phan RN  Relief Scrub: Jemima Telles         Drains and/or Catheters: * None in log *    Tourniquet Times:         Implants:     Findings: see operative note    Indications: Sasha Ceballos is an 29 y.o. female who is having surgery for Acquired deformity of nose [M95.0]  Deviated nasal septum [J34.2]  Hypertrophy of nasal turbinates [J34.3].     The patient was seen in the preoperative area. The risks, benefits, complications, treatment options, non-operative alternatives, expected recovery and outcomes were discussed with the patient. The possibilities of reaction to medication, pulmonary aspiration, injury to surrounding structures, bleeding, recurrent infection, the need for additional procedures, failure to diagnose a condition, and creating a complication requiring transfusion or operation were discussed with the patient. The patient concurred with the proposed plan, giving informed consent.  The site of surgery was properly noted/marked if necessary per policy. The patient has been actively warmed in preoperative area. Preoperative antibiotics have been ordered and given within 1 hours of incision. Venous thrombosis prophylaxis have been ordered including bilateral sequential compression devices    Procedure Details:     The patient presented with nasal obstruction.  The patient was found to have significant nasal septal deviation, nasal valve collapse, and inferior turbinate hypertrophy.  The patient also expressed desire to undergo rhinoplasty.  I discussed with the patient, in detail, the risks, benefits, limitations, and alternatives to the planned procedures. Risks discussed included but were not limited to infection, bleeding, septal perforation, synechia, need for further surgery, failure to achieve our desired results, worsened breathing, and worsened appearance. The patient expressed understanding of each of these complications and  had all questions answered.    DESCRIPTION OF PROCEDURE:  The patient was brought to the operating room and placed in the supine position, then intubated under general anesthesia.  The nose was injected with 1% lidocaine with epinephrine and then packed with decongestant-soaked pledgets.  The face was prepped and draped in the usual sterile fashion.  A transcolumellar incision was performed in an inverted-V fashion, which was continued into marginal incisions bilaterally using sharp scissors.  The skin-soft tissue envelope was elevated in the supraperichondrial plane sharply over the nasal tip and midvault.  The periosteum overlying the bony dorsum was then carefully elevated.  The anterior septal angle was identified and mucoperichondrial flaps were elevated bilaterally.  The deviated septum was treated by removing deformed quadrangular cartilage and bony septum and septal cartilage was harvested, taking care to preserve a >1.0 cm L-shaped strut.  The upper lateral cartilages were then  from the dorsal septum bilaterally.      The cartilaginous and bony dorsal hump was then excised en-bloc using an 11 blade and [place] osteotome.  The bony dorsum was then rasped using both the coarse and fine rasp, to further refine the profile line.    A 3mm osteotome was then used to perform medial and lateral osteotomies.  Auto- grafts were then created using the upper lateral cartilage and turn in flaps, and suture fixed with 5-0 PDS in between the upper lateral cartilage and septum bilaterally, in order to treat the internal nasal valve, stabilize the midvault, and control/optimize the midvault width.    A cephalic trim was performed with preservation of 8mm of lateral teofilo on each side, measured with calipers for optimal symmetry.    The nasal tip was stabilized with the use of: septocolumellar sutures and caudal septal extension graft. The tip was further refined using intradomal / interdomal sutures.      Bilateral inferior turbinate reduction and lateralization was then performed.  A [place] fine needle tip bovie electrocautery was used to perform intramural cauterization for submucous resection bilaterally.  A [place] elevator was then used to outfracture the inferior turbinate bilaterally.     We then turned our attention to closure.  Meticulous skin closure was performed using 6-0 prolene in a simple, interrupted fashion.  The nose was gently cleansed with sterile saline and an external nasal splint was placed in the usual fashion.  This concluded the goals of the procedure.  The patient was turned over to Anesthesia, extubated, and transferred to the PACU.      Complications:  None; patient tolerated the procedure well.    Disposition: PACU - hemodynamically stable.  Condition: stable         Additional Details: No qualified resident was available and Jorge Serna served as my full and primary assistant for the entire case.      Attending Attestation: I was present and scrubbed for the entire procedure.    Marlon Rodriguez  Phone Number: [phone]

## 2024-04-05 NOTE — DISCHARGE INSTRUCTIONS
NASAL SURGERY POST-OPERATIVE  PATIENT INSTRUCTIONS      FOLLOW-UP:  Please make an appointment with our office in 5-7 days.  Call us immediately if you have any fevers greater than 102.5, drainage from your wound that is not clear or looks infected, persistent bleeding, increasing pain,  or persistent difficulty breathing.      WOUND CARE INSTRUCTIONS:    Do not blow your nose until instructed or remove any packing unless instructed to do so. Wipe or dab the nose gently with a q-tip and hydrogen peroxide.  Change the dressing under the nose (if present) as needed.  Once drainage has stopped you no longer need to keep a dressing in place.  Brush your teeth gently with a soft tooth bruch only.  Wash your face carefully, avoiding the dressing and taking care not to get splint wet, if present.  Sleep with your head up and only in the supine position.  Don't turn your head side to side.      DIET:  You may eat any foods that you can tolerate.  It is a good idea to eat a high fiber diet and take in plenty of fluids to prevent constipation.  If you do become constipated you may want to take a mild laxative or take ducolax tablets on a daily basis until your bowel habits are regular.      MEDICATIONS:  Try to take narcotic medications and anti-inflammatory medications, such as tylenol, ibuprofen, naprosyn, etc., with food.  This will minimize stomach upset from the medication.  Should you develop nausea and vomiting from the pain medication, or develop a rash, please discontinue the medication and contact your physician.  You should not drive, make important decisions, or operate machinery when taking narcotic pain medication.    QUESTIONS:  Please feel free to call your physician or the hospital  if you have any questions, and they will be glad to assist you.    May have Tylenol after: 5:00 pm    May have Ibuprofen/advil/motrin/aleve tomorrow 4/6/24 at 11:00 am    You received Tramadol at 2:00 pm you may have  Tramadol at 8:00 pm    TO REACH YOUR PHYSICIAN AFTER HOURS CALL  AND ASK FOR THE PHYSICIAN ON CALL    Scopalamine patch may stay on for 72 hrs.  Remove patch, discard away form pets, children.  Do not touch eyes!  Wash hands thoroughly      Before your follow up appointment eat a small meal and you may take ibuprofen/motrin/advil 45 minutes prior to your appointment if worried about discomfort    Sleep with your head elevated for 3 days to help minimize swelling and bruising  Apply cold compresses to cheeks and forehead to reduce swelling and bruising. 20 minutes on and 20 minutes off over the first three days.    Try to eat softer foods. Chewing harder foods can increase bruising and swelling

## 2024-04-05 NOTE — ANESTHESIA POSTPROCEDURE EVALUATION
Patient: Sasha Ceballos    Procedure Summary       Date: 04/05/24 [place] / [facility] [place]    Anesthesia Start: 1100 Anesthesia Stop: 1321    Procedure: Septoplasty, Inferior turbinate reduction, Nasal valve repair, Cosmetic rhinoplasty Diagnosis:       Acquired deformity of nose      Deviated nasal septum      Hypertrophy of nasal turbinates      (Acquired deformity of nose [M95.0])      (Deviated nasal septum [J34.2])      (Hypertrophy of nasal turbinates [J34.3])    Surgeons: Marlon Rodriguez MD Responsible Provider: Thaddeus Carreon MD    Anesthesia Type: general ASA Status: 2            Anesthesia Type: general    Vitals Value Taken Time   /64 04/05/24 1341   Temp 37 °C (98.6 °F) 04/05/24 1320   Pulse 72 04/05/24 1341   Resp 16 04/05/24 1341   SpO2 96 % 04/05/24 1341       Anesthesia Post Evaluation    Patient location during evaluation: bedside  Patient participation: complete - patient participated  Level of consciousness: awake  Pain management: adequate  Airway patency: patent  Cardiovascular status: stable  Respiratory status: acceptable  Hydration status: acceptable  Postoperative Nausea and Vomiting: none  Comments: Did very well    No notable events documented.

## 2024-04-05 NOTE — ANESTHESIA PROCEDURE NOTES
Airway  Date/Time: 4/5/2024 11:07 AM  Urgency: elective    Airway not difficult    Staffing  Performed: IBETH   Authorized by: Thaddeus Carreon MD    Performed by: ROSE Tovar  Patient location during procedure: OR    Indications and Patient Condition  Indications for airway management: anesthesia and airway protection  Spontaneous Ventilation: absent  Sedation level: deep  Preoxygenated: yes  Patient position: sniffing  Mask difficulty assessment: 1 - vent by mask  Planned trial extubation    Final Airway Details  Final airway type: endotracheal airway      Successful airway: ETT  Cuffed: yes   Successful intubation technique: direct laryngoscopy  Endotracheal tube insertion site: oral  Blade: Tanisha  Blade size: #3  ETT size (mm): 7.0  Cormack-Lehane Classification: grade I - full view of glottis  Placement verified by: chest auscultation   Measured from: teeth  ETT to teeth (cm): 21  Number of attempts at approach: 1    Additional Comments  Lips/teeth in preanesthetic condition.

## 2024-04-05 NOTE — ANESTHESIA PREPROCEDURE EVALUATION
Patient: Sasha Ceballos    Procedure Information       Anesthesia Start Date/Time: 04/05/24 1100    Procedure: Septoplasty, Inferior turbinate reduction, Nasal valve repair, Cosmetic rhinoplasty - Total case length= 2.5 hours    Location: [facility] OR 04 / [facility] OR    Surgeons: Marlon Rodriguez MD            Relevant Problems   Neuro   (+) Depression      HEENT   (+) Vision loss of right eye       Clinical information reviewed:   Tobacco  Allergies  Meds  Problems  Med Hx  Surg Hx  OB Status    Fam Hx  Soc Hx        NPO Detail:  NPO/Void Status  Date of Last Liquid: 04/04/24  Time of Last Liquid: 2300  Date of Last Solid: 04/03/24  Time of Last Solid: 2300  Last Intake Type: Clear fluids  Time of Last Void: 1041         Physical Exam    Airway  Mallampati: II     Cardiovascular   Rhythm: regular  Rate: normal     Dental - normal exam     Pulmonary   Breath sounds clear to auscultation     Abdominal        Anesthesia Plan    History of general anesthesia?: yes  History of complications of general anesthesia?: no    ASA 2     general     The patient is not a current smoker.    intravenous induction   Anesthetic plan and risks discussed with patient.    Plan discussed with CAA.

## 2024-04-08 ASSESSMENT — PAIN SCALES - GENERAL: PAINLEVEL_OUTOF10: 0 - NO PAIN

## 2024-04-11 ENCOUNTER — OFFICE VISIT (OUTPATIENT)
Dept: OTOLARYNGOLOGY | Facility: CLINIC | Age: 29
End: 2024-04-11
Payer: COMMERCIAL

## 2024-04-11 VITALS — BODY MASS INDEX: 18.88 KG/M2 | WEIGHT: 124.6 LBS | HEIGHT: 68 IN

## 2024-04-11 DIAGNOSIS — M95.0 NASAL DEFORMITY: Primary | ICD-10-CM

## 2024-04-11 PROCEDURE — 99024 POSTOP FOLLOW-UP VISIT: CPT | Performed by: OTOLARYNGOLOGY

## 2024-04-11 PROCEDURE — 1036F TOBACCO NON-USER: CPT | Performed by: OTOLARYNGOLOGY

## 2024-04-11 NOTE — PROGRESS NOTES
Facial Plastic & Reconstructive Surgery    POV after cosmetic + functional septorhino    Doing well, endorses improved breathing bilaterally  Continues salt water sprays and vaseline to nares    Nasal splint removed  Septum midline  Nasal valve patent  Incisions c/d/I    Continue nasal saline sprays and ointment to nares  RTC 3 months

## 2024-05-17 NOTE — PROGRESS NOTES
Chief complaint: salivary stone    Sasha Ceballos is a 29 y.o. female referred to me today for a salivary stone. She has recently undergone Septoplasty, Inferior turbinate reduction, Nasal valve repair, Cosmetic rhinoplasty with Dr. Rodriguez.     2 weeks ago the patient had small white bump under her tongue. Her left parotid and submandibular regions swelled after eating. She took amoxicillin, massages, warm compresses, and sour candies over the last week with some improvement, but the last 4-5 days there has been no improvement. She is staying well hydrated, drinks 48 oz per day.    Also is concerned about small bump on the right side roof of her mouth.  No growth, no pain.  Denies odynophagia, dysphagia or otalgia. Tolerating a regular diet.  Denies weight loss.  No fevers/chills.  No night sweats.     Tob: Neversmoker  ETOH: Once per month    PMH  Past Medical History:   Diagnosis Date    Cervicalgia 12/27/2016    Cervicalgia of brndlybp-intcwti-goyfm region    Hypothyroidism     Migraine with aura, not intractable, without status migrainosus 12/27/2016    Migraine with aura and without status migrainosus, not intractable    Personal history of other diseases of the respiratory system     History of asthma    Personal history of other endocrine, nutritional and metabolic disease 09/15/2022    History of Hashimoto thyroiditis    Personal history of other specified conditions     History of febrile seizure    Pott's disease         PSH  Past Surgical History:   Procedure Laterality Date    CT ANGIO NECK  1/20/2023    CT NECK ANGIO W AND WO IV CONTRAST 1/20/2023 [facility]    CT HEAD ANGIO W AND WO IV CONTRAST  1/20/2023    CT HEAD ANGIO W AND WO IV CONTRAST 1/20/2023 [facility]    FOOT SURGERY  12/27/2016    Foot Surgery    OTHER SURGICAL HISTORY  09/15/2022    Corneal lasik        ROS  Review of Systems   Constitutional: Negative.    HENT: Negative.     Eyes: Negative.    Respiratory: Negative.      Cardiovascular: Negative.    Gastrointestinal: Negative.    Endocrine: Negative.    Musculoskeletal: Negative.    Allergic/Immunologic: Negative.         PE  ENT Physical Exam  Constitutional  Appearance: patient appears well-developed,  Communication/Voice: communication appropriate for developmental age;  Head and Face  Appearance: head appears normal and face appears normal;  Palpation: facial palpation normal;  Salivary: Salivary comments: bilateral submandibular and parotid glands palpable, nontender, not enlarged. Bilateral salivary ducts with clear saliva flowing freely, no purulent or sanguinous drainage  Ear  Auricles: right auricle normal; left auricle normal;  Ear Canals: right ear canal normal; left ear canal normal;  Tympanic Membranes: right tympanic membrane normal; left tympanic membrane normal;  Nose  External Nose: nares narrow;  Nose comments: Recent septorhinoplasty, septum straight  Oral Cavity/Oropharynx  Lips: normal;  Teeth: normal;  Tongue: normal;  Oral mucosa: normal;  Soft palate: with mass (subcentimeter pedicled flesh colored growth c/w papilloma at the right posterior soft papate/tonsil junction) present;  Tonsils: bilateral tonsils 1+,  Neck  Neck: scars (2cm keloid on anterior chest at midline) present; no neck tenderness present;  Thyroid: no thyroid mass present;       Palate / Uvula biopsy    Date/Time: 5/22/2024 4:02 PM    Performed by: Yocasta Bautista MD  Authorized by: Yocasta Bautista MD    Consent:     Consent obtained:  Verbal    Consent given by:  Patient    Risks, benefits, and alternatives were discussed: yes    Universal protocol:     Patient identity confirmed:  Verbally with patient  Indications:     Indications:  Lesion  Sedation:     Sedation type:  None  Anesthesia:     Anesthesia method:  Topical application  Procedure specific details:      Lesion was grasped and removed with scissors.  No bleeding.  Post-procedure details:     Procedure completion:   Tolerated well, no immediate complications       ASSESSMENT AND PLAN  Problem List Items Addressed This Visit       Papilloma of tonsil - Primary    Current Assessment & Plan     Right soft palate papilloma  Recommend observation vs excision - she elected for excision  This was successfully biopsied/excised in the office today, sent to pathology  Will message with results, but reassurance provided  Follow up as needed         Relevant Orders    Palate / Uvula biopsy    Surgical Pathology Exam (Completed)    Sialadenitis    Current Assessment & Plan     Likely submandibular stone resulted in acute sialadenitis  Now resolved  No acute infection today  I did explain the diagnosis, possible alternative diagnoses, and the plans for evaluation and treatment of the problem.    It appears the stone has passed.  No treatment needed at this time.  Continue hydration and massage.  I answered all of the patients questions.  They did appear to understand and confirmed this, and do agree to proceed as outlined above.   Follow up as needed.             Yocasta Bautista MD    Head & Neck Surgical Oncology & Reconstruction  Department of Otolaryngology - Head and Neck Surgery     By signing my name below, I, Naomi Walshibe, attest that this documentation has been prepared under the direction and in the presence of Dr. Yocasta Bautista MD.     All medical record entries made by the Scribe were at my direction and personally dictated by me, Dr. Yocasta Bautista. I have reviewed the chart and agree that the record accurately reflects my personal performance of the history, physical exam, discussion and plan.

## 2024-05-22 ENCOUNTER — OFFICE VISIT (OUTPATIENT)
Dept: OTOLARYNGOLOGY | Facility: HOSPITAL | Age: 29
End: 2024-05-22
Payer: COMMERCIAL

## 2024-05-22 VITALS — HEIGHT: 68 IN | BODY MASS INDEX: 18.16 KG/M2 | WEIGHT: 119.8 LBS

## 2024-05-22 DIAGNOSIS — K11.20 SIALADENITIS: ICD-10-CM

## 2024-05-22 DIAGNOSIS — D10.4 PAPILLOMA OF TONSIL: Primary | ICD-10-CM

## 2024-05-22 PROCEDURE — 88305 TISSUE EXAM BY PATHOLOGIST: CPT | Mod: TC | Performed by: OTOLARYNGOLOGY

## 2024-05-22 PROCEDURE — 88305 TISSUE EXAM BY PATHOLOGIST: CPT | Performed by: PATHOLOGY

## 2024-05-22 ASSESSMENT — ENCOUNTER SYMPTOMS
RESPIRATORY NEGATIVE: 1
MUSCULOSKELETAL NEGATIVE: 1
ENDOCRINE NEGATIVE: 1
ALLERGIC/IMMUNOLOGIC NEGATIVE: 1
GASTROINTESTINAL NEGATIVE: 1
EYES NEGATIVE: 1
CARDIOVASCULAR NEGATIVE: 1
CONSTITUTIONAL NEGATIVE: 1

## 2024-05-22 ASSESSMENT — PATIENT HEALTH QUESTIONNAIRE - PHQ9
2. FEELING DOWN, DEPRESSED OR HOPELESS: NOT AT ALL
1. LITTLE INTEREST OR PLEASURE IN DOING THINGS: NOT AT ALL
SUM OF ALL RESPONSES TO PHQ9 QUESTIONS 1 AND 2: 0

## 2024-05-31 LAB
LABORATORY COMMENT REPORT: NORMAL
PATH REPORT.FINAL DX SPEC: NORMAL
PATH REPORT.GROSS SPEC: NORMAL
PATH REPORT.RELEVANT HX SPEC: NORMAL
PATH REPORT.TOTAL CANCER: NORMAL

## 2024-06-03 PROBLEM — D10.4 PAPILLOMA OF TONSIL: Status: ACTIVE | Noted: 2024-06-03

## 2024-06-03 PROBLEM — K11.20 SIALADENITIS: Status: ACTIVE | Noted: 2024-06-03

## 2024-06-03 NOTE — ASSESSMENT & PLAN NOTE
Likely submandibular stone resulted in acute sialadenitis  Now resolved  No acute infection today  I did explain the diagnosis, possible alternative diagnoses, and the plans for evaluation and treatment of the problem.    It appears the stone has passed.  No treatment needed at this time.  Continue hydration and massage.  I answered all of the patients questions.  They did appear to understand and confirmed this, and do agree to proceed as outlined above.   Follow up as needed.

## 2024-06-03 NOTE — ASSESSMENT & PLAN NOTE
Right soft palate papilloma  Recommend observation vs excision - she elected for excision  This was successfully biopsied/excised in the office today, sent to pathology  Will message with results, but reassurance provided  Follow up as needed

## 2024-07-09 NOTE — PROGRESS NOTES
Facial Plastic & Reconstructive Surgery      7/11/24  POV after cosmetic and functional nasal surgery on 4/5/24; splints removed 4/11/24    Doing well, endorses improved breathing bilaterally  Continues salt water sprays and ointment to nares  Very pleased with cosmetic outcome     Incisions c/d/I    PHOTOS today taken by Marck Ferguson     Plan:  Continue nasal saline sprays and ointment to nares  RTC 4-6 months or sooner if needed     Jorge Serna PA-C    -------------------------------------------  4/11/24  POV after cosmetic + functional septorhino    Doing well, endorses improved breathing bilaterally  Continues salt water sprays and vaseline to nares    Nasal splint removed  Septum midline  Nasal valve patent  Incisions c/d/I    Continue nasal saline sprays and ointment to nares  RTC 3 months

## 2024-07-11 ENCOUNTER — APPOINTMENT (OUTPATIENT)
Dept: OTOLARYNGOLOGY | Facility: CLINIC | Age: 29
End: 2024-07-11
Payer: COMMERCIAL

## 2024-07-11 VITALS — HEIGHT: 68 IN | BODY MASS INDEX: 19.82 KG/M2 | WEIGHT: 130.8 LBS

## 2024-07-11 DIAGNOSIS — M95.0 NASAL VALVE COLLAPSE: ICD-10-CM

## 2024-07-11 DIAGNOSIS — J34.3 HYPERTROPHY OF NASAL TURBINATES: ICD-10-CM

## 2024-07-11 DIAGNOSIS — M95.0 NASAL DEFORMITY: ICD-10-CM

## 2024-07-11 DIAGNOSIS — R06.89 DIFFICULTY BREATHING: ICD-10-CM

## 2024-07-11 DIAGNOSIS — J34.2 DEVIATED SEPTUM: Primary | ICD-10-CM

## 2024-07-11 PROCEDURE — 99024 POSTOP FOLLOW-UP VISIT: CPT | Performed by: PHYSICIAN ASSISTANT

## 2024-07-11 PROCEDURE — 1036F TOBACCO NON-USER: CPT | Performed by: PHYSICIAN ASSISTANT

## 2024-07-11 ASSESSMENT — PAIN SCALES - GENERAL: PAINLEVEL: 0-NO PAIN

## 2024-08-06 ENCOUNTER — TELEPHONE (OUTPATIENT)
Dept: OTOLARYNGOLOGY | Facility: HOSPITAL | Age: 29
End: 2024-08-06
Payer: COMMERCIAL

## 2024-08-06 NOTE — TELEPHONE ENCOUNTER
----- Message from Velia MOON sent at 8/6/2024  9:38 AM EDT -----  Regarding: call back  Patient would like a call back regarding getting a retro pre auth for her procedure she had done on 05/22/2024

## 2024-08-06 NOTE — TELEPHONE ENCOUNTER
Left a message for patient letting her know I was returning her call from this morning. Left the office number for her to call me back.

## 2024-10-25 ENCOUNTER — APPOINTMENT (OUTPATIENT)
Dept: OPHTHALMOLOGY | Facility: CLINIC | Age: 29
End: 2024-10-25
Payer: COMMERCIAL

## 2024-11-14 ENCOUNTER — APPOINTMENT (OUTPATIENT)
Dept: OTOLARYNGOLOGY | Facility: CLINIC | Age: 29
End: 2024-11-14
Payer: COMMERCIAL

## 2024-11-18 ENCOUNTER — APPOINTMENT (OUTPATIENT)
Dept: OPHTHALMOLOGY | Facility: CLINIC | Age: 29
End: 2024-11-18
Payer: COMMERCIAL

## 2024-11-20 ENCOUNTER — APPOINTMENT (OUTPATIENT)
Dept: OPHTHALMOLOGY | Facility: CLINIC | Age: 29
End: 2024-11-20
Payer: COMMERCIAL

## 2024-11-22 ENCOUNTER — APPOINTMENT (OUTPATIENT)
Dept: OBSTETRICS AND GYNECOLOGY | Facility: CLINIC | Age: 29
End: 2024-11-22
Payer: COMMERCIAL

## 2024-11-22 VITALS
DIASTOLIC BLOOD PRESSURE: 80 MMHG | BODY MASS INDEX: 19.2 KG/M2 | WEIGHT: 126.7 LBS | HEIGHT: 68 IN | SYSTOLIC BLOOD PRESSURE: 118 MMHG

## 2024-11-22 DIAGNOSIS — Z30.09 ENCOUNTER FOR OTHER GENERAL COUNSELING OR ADVICE ON CONTRACEPTION: Primary | ICD-10-CM

## 2024-11-22 PROCEDURE — 3008F BODY MASS INDEX DOCD: CPT | Performed by: ADVANCED PRACTICE MIDWIFE

## 2024-11-22 PROCEDURE — 99202 OFFICE O/P NEW SF 15 MIN: CPT | Performed by: ADVANCED PRACTICE MIDWIFE

## 2024-11-22 NOTE — PROGRESS NOTES
"GYNECOLOGY PROGRESS NOTE          CC:   see below. Patient had no bleeding until recently and she has had some spotting at times. Liked not having any bleeding and may want the Mirena removed and replaced with a new unit. She has not sexual partner at this time and is unsure when she would like to have children and wanted more information about effects of having the IUD removed and fertility. Patient states when she had her IUD placed she passed out so if she has it replaced she will bring someone with her. Also she needs to establish care for routine pap testing and gyn care.   Chief Complaint   Patient presents with    New Patient Visit     New patient visit.   Patient has a mirena. Inserted 11/2018.  Patient would like to discuss getting it changed out.   Patient would also like to discuss how it would effect when she wanted to start trying to conceive.   Every couple of months will have a little bit of bleeding        HPI:  Sasha Ceballos is here with questions about IUD removal and insertion of new unit and fertility.      ROS:  GYN - see HPI        PHYSICAL EXAM:  /80 (BP Location: Left arm, Patient Position: Sitting, BP Cuff Size: Adult)   Ht 1.727 m (5' 8\")   Wt 57.5 kg (126 lb 11.2 oz)   BMI 19.26 kg/m²   GEN:  A&O, NAD  HEENT:  head HC/AT, no visible goiter  PSYCH:  normal affect, non-anxious      IMPRESSION/PLAN:    A: some spotting with IUD in place now. IUD due out in 2026 (8years). Not sexually active at this time.  Plan: 1. Will check insurance for IUD removal/insertion of new Mirena. 2. Will need pap and breast exam at time of procedure too.   Problem List Items Addressed This Visit    None         Denisse Singh, NUSRAT-SHERLEY        "

## 2024-12-17 ENCOUNTER — APPOINTMENT (OUTPATIENT)
Dept: OBSTETRICS AND GYNECOLOGY | Facility: CLINIC | Age: 29
End: 2024-12-17
Payer: COMMERCIAL

## 2024-12-26 ENCOUNTER — APPOINTMENT (OUTPATIENT)
Dept: OBSTETRICS AND GYNECOLOGY | Facility: CLINIC | Age: 29
End: 2024-12-26
Payer: COMMERCIAL

## 2024-12-26 VITALS — WEIGHT: 128.3 LBS | BODY MASS INDEX: 19.51 KG/M2

## 2024-12-26 DIAGNOSIS — N93.9 ABNORMAL UTERINE BLEEDING (AUB): ICD-10-CM

## 2024-12-26 DIAGNOSIS — Z30.433 ENCOUNTER FOR REMOVAL AND REINSERTION OF INTRAUTERINE CONTRACEPTIVE DEVICE (IUD): Primary | ICD-10-CM

## 2024-12-26 DIAGNOSIS — R10.2 PELVIC PAIN: ICD-10-CM

## 2024-12-26 PROCEDURE — 87491 CHLMYD TRACH DNA AMP PROBE: CPT

## 2024-12-26 PROCEDURE — 87591 N.GONORRHOEAE DNA AMP PROB: CPT

## 2024-12-26 PROCEDURE — 58301 REMOVE INTRAUTERINE DEVICE: CPT | Performed by: ADVANCED PRACTICE MIDWIFE

## 2024-12-26 PROCEDURE — 87661 TRICHOMONAS VAGINALIS AMPLIF: CPT

## 2024-12-26 PROCEDURE — 58300 INSERT INTRAUTERINE DEVICE: CPT | Performed by: ADVANCED PRACTICE MIDWIFE

## 2024-12-26 ASSESSMENT — PATIENT HEALTH QUESTIONNAIRE - PHQ9
2. FEELING DOWN, DEPRESSED OR HOPELESS: NOT AT ALL
1. LITTLE INTEREST OR PLEASURE IN DOING THINGS: NOT AT ALL
SUM OF ALL RESPONSES TO PHQ9 QUESTIONS 1 & 2: 0

## 2024-12-26 NOTE — PROGRESS NOTES
IUD REMOVAL PROCEDURE NOTE    Patient's pre-procedure questions were answered and a written informed consent form was signed.   A speculum was  placed in the vagina.  The cervix was  cleansed with Betadine.  IUD strings were identified and were grasped, then with gentle traction the IUD was removed without difficulty.  After removal the IUD was inspected and it was completely intact.   Patient tolerated the procedure well, there are no complications.  EBL minimal.      KRZYSZTOF Aguilar      IUD INSERTION PROCEDURE NOTE    Patient's pre-procedure questions were answered and a written informed consent form was signed.  Urine HCG was  negative.    The introducer was then inserted up into the uterus to approximately a centimeter below the uterine fundus.  The IUD was then unloaded from the introducer near the fundus and then the introducer was then removed.  The IUD strings were cut to 2.5 cm.  The speculum was then removed.  The patient tolerated the procedure well.  There were no complications.  Blood loss was minimal.      F/U PRN or when next preventative GYN examination due.      KRZYSZTOF Aguilar

## 2024-12-27 LAB
C TRACH RRNA SPEC QL NAA+PROBE: NEGATIVE
N GONORRHOEA DNA SPEC QL PROBE+SIG AMP: NEGATIVE
T VAGINALIS RRNA SPEC QL NAA+PROBE: NEGATIVE

## 2025-02-12 ENCOUNTER — APPOINTMENT (OUTPATIENT)
Dept: ALLERGY | Facility: CLINIC | Age: 30
End: 2025-02-12
Payer: COMMERCIAL

## 2025-03-19 ENCOUNTER — CLINICAL SUPPORT (OUTPATIENT)
Dept: AUDIOLOGY | Facility: CLINIC | Age: 30
End: 2025-03-19
Payer: MEDICARE

## 2025-03-19 ENCOUNTER — OFFICE VISIT (OUTPATIENT)
Facility: CLINIC | Age: 30
End: 2025-03-19
Payer: MEDICARE

## 2025-03-19 VITALS
DIASTOLIC BLOOD PRESSURE: 76 MMHG | SYSTOLIC BLOOD PRESSURE: 112 MMHG | BODY MASS INDEX: 19.4 KG/M2 | WEIGHT: 128 LBS | HEIGHT: 68 IN

## 2025-03-19 DIAGNOSIS — H61.21 RIGHT EAR IMPACTED CERUMEN: Primary | ICD-10-CM

## 2025-03-19 DIAGNOSIS — H93.8X3 EAR FULLNESS, BILATERAL: Primary | ICD-10-CM

## 2025-03-19 DIAGNOSIS — M26.609 TMJ DYSFUNCTION: ICD-10-CM

## 2025-03-19 DIAGNOSIS — J31.0 CHRONIC RHINITIS: ICD-10-CM

## 2025-03-19 PROCEDURE — 99214 OFFICE O/P EST MOD 30 MIN: CPT

## 2025-03-19 PROCEDURE — 92557 COMPREHENSIVE HEARING TEST: CPT | Performed by: AUDIOLOGIST

## 2025-03-19 PROCEDURE — 69210 REMOVE IMPACTED EAR WAX UNI: CPT

## 2025-03-19 PROCEDURE — 1036F TOBACCO NON-USER: CPT

## 2025-03-19 PROCEDURE — 92550 TYMPANOMETRY & REFLEX THRESH: CPT | Mod: 52 | Performed by: AUDIOLOGIST

## 2025-03-19 PROCEDURE — 3008F BODY MASS INDEX DOCD: CPT

## 2025-03-19 RX ORDER — TRIAMCINOLONE ACETONIDE 55 UG/1
2 SPRAY, METERED NASAL DAILY
Qty: 16.5 G | Refills: 11 | Status: SHIPPED | OUTPATIENT
Start: 2025-03-19 | End: 2026-03-19

## 2025-03-19 ASSESSMENT — PAIN SCALES - GENERAL: PAINLEVEL_OUTOF10: 0 - NO PAIN

## 2025-03-19 ASSESSMENT — ENCOUNTER SYMPTOMS: OCCASIONAL FEELINGS OF UNSTEADINESS: 0

## 2025-03-19 ASSESSMENT — PAIN - FUNCTIONAL ASSESSMENT: PAIN_FUNCTIONAL_ASSESSMENT: 0-10

## 2025-03-19 NOTE — PROGRESS NOTES
AUDIOLOGY ADULT AUDIOMETRIC EVALUATION      Name:  Sasha Ceballos  :  1995  Age:  30 y.o.  Date of Evaluation: 25    History:  Reason for visit:  Ms. Ceballos was seen today as part of the visit with PERRY Ocasio for an evaluation of hearing.   Chief Complaint   Patient presents with    Ear Fullness     Reported for approximately the past year she has experienced a sensation of bilateral ear fullness. Reported she has attempted to perform the Valsalva maneuver, however, she does not believe her ears remain open. Stated she has noticed a sensation of bilateral ear congestion with popping in each ear.   She also has some significant concerns for her sinuses. Stated she is unsure if she has been experiencing numerous infections, or allergy problems.   Denied any recent ear drainage, but reported noticeable drainage from the ears last months.  Stated she has a history of POTS and will experience dizziness, but denied any recent falls.   Denied any difficulty hearing or communicating and does not have any concern for hearing loss.  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   Denied any current otalgia, tinnitus, ear surgery, or sudden hearing loss.    EVALUATION     See Audiogram    RESULTS:    Otoscopic Evaluation:   Right Ear: Otoscopy revealed a clear healthy canal and a healthy tympanic membrane was visualized.   Left Ear:  Otoscopy revealed a clear healthy canal and a healthy tympanic membrane was visualized.     Immittance:  Immittance Measures: 226 Hz   Right Ear: Tympanometric testing revealed a normal type A tympanogram with normal middle ear pressure and normal static compliance.  Left Ear: Tympanometric testing revealed a normal type A tympanogram with normal middle ear pressure and normal static compliance.    Right: Ipsilateral acoustic reflexes were present at, 500-4,000 Hz, at expected sensation levels.  Left Ear: Ipsilateral acoustic reflexes were  present at, 500-4,000 Hz, at expected sensation levels.    Test technique:  Pure Tone Audiometry via TDH headphones    Reliability:   excellent    Pure Tone Audiometry:    Right Ear: Audiometric testing indicated normal peripheral hearing sensitivity from 125-8,000 Hz.  Left Ear:   Audiometric testing indicated normal peripheral hearing sensitivity from 125-8,000 Hz.      Speech Audiometry:   Right Ear:  Speech Reception Threshold (SRT) was obtained at 10 dBHL                 Word Recognition scores were excellent (100%) in quiet when words were presented at 50 dBHL  Left Ear:  Speech Reception Threshold (SRT) was obtained at 10 dBHL                 Word Recognition scores were excellent (100%) in quiet when words were presented at 50 dBHL  Testing was performed with recorded NU-6 speech words in quiet. Speech thresholds were in good agreement with the pure tone averages in each ear.     IMPRESSIONS:  Today's test results are consistent with normal peripheral hearing sensitivity, bilaterally.  The patient was counseled with regard to the findings.    RECOMMENDATIONS:  * Continue medical follow up with PERRY Ocasio   * Retest as medically indicated, or sooner if a change in hearing sensitivity is noticed.   * Wear hearing protection while in the presence of loud sounds.     PATIENT EDUCATION:   Discussed results and recommendations with the patient and a copy of the hearing test was provided.  Questions were addressed and the patient was encouraged to contact our department should concerns arise.  The patient was seen from  10:30-11:00 am.

## 2025-03-19 NOTE — PROGRESS NOTES
Patient ID: Sasha Ceballos is a 30 y.o. female who presents for the evaluation of ear and sinuses.    PROVIDER IMPRESSIONS:  DIAGNOSES/PROBLEMS:  -Chronic rhinitis  -TMJ dysfunction  -Right cerumen impaction    ASSESSMENT:   Sasha Ceballos is a pleasant 30 y.o. female with a history of DNS s/p cosmetic rhinoplasty, septoplasty and inferior turbinate reduction (4/5/24) who presents for subsequent encounter with symptoms of bilateral ear fullness, chronic acquired nasal congestion, and postnasal drip sensation. Based on the clinical information provided, symptoms and clinical exam findings are consistent with right cerumen impaction, TMJ dysfunction, and chronic rhinitis. Using appropriate instrumentation, impacted cerumen was successfully removed from the EAC on the right. Reassurance provided to patient that exam today showed no evidence of acute infection or inflammation in the EAC bilaterally and that TM appears with no evidence of infection, effusion, retraction or perforation bilaterally.  Audiogram reviewed in detail with the patient, which revealed normal hearing and middle ear function results in the ears bilaterally.     PLAN:  I recommended starting nasal steroid spray  triamcinolone (Nasocort). I recommended either 2 puffs into each nostril daily, or 1 puff into each nostril twice daily for a consistent trial of at least 4-6 weeks. Patient counseled that this medication is a topical intranasal steroid nasal spray indicated to reduce nasal inflammation. Patient was educated on proper administration of nasal spray, by tilting their head down and pointing the applicator tip towards the lateral wall of the nose/corner of the eye on the same side. I advised patient to avoid pointing applicator toward the septum due to the risk of nasal bleeding.  Patient informed to discontinue the spray if they experience adverse side effects. This medication may be purchased over the counter; a prescription was sent to the  patient's pharmacy upon request.  I recommended patient discontinues use of Flonase nasal spray.   I recommend the patient continues to perform intranasal saline irrigations, either once daily or every other day. I educated the patient that saline irrigations aid in flushing out residual mucus, crusts, allergens or other environmental irritants in the nasal cavity. I emphasized that this will help clean the nasal cavity PRIOR to use of the medicated intranasal spray. I counseled the patient on appropriate administration of saline irrigations. I informed the patient that saline irrigation kits may be purchased over the counter.    I recommended implementing the following lifestyle strategies for TMJ symptom management: Initiate a soft diet for the next 2-3 weeks and avoid eating chewy/tough foods such as gum, raw fruits/vegetables, tough meats, or anything else that requires significant mastication. Examples of appropriate soft foods include mashed potatoes, soft pasta, macaroni, yogurt, ice cream, and other things may easily be mashed with a fork. Perform temple and cheekbone massages twice daily for several minutes by using your knuckles to gently massage in circles near temples and along your cheekbones as tolerated. Apply a warm/cold compress along the entire side of the affected face, directly over TMJ structures, once or twice daily for 20 minutes at a time and remove sooner if skin irritation occurs. Consider purchase of a custom nighttime mouth guard from a dentist to prevent jaw clenching and/or teeth grinding while asleep. If facial pain is present, may use o.t.c.  ibuprofen 600 mg three times a day for three days only with meals, advised to monitor for side effects of upset stomach and ulcers if ibuprofen is taken on an empty stomach and advised to avoid NSAIDs if previously deemed as contraindicated.   I recommended referral to  Physical Therapy for further E/M of TMJ dysfunction. Referral e-submitted  and patient advised to contact  scheduling to coordinate follow-up.   Follow-up: Patient may schedule for follow-up in 6 weeks to evaluate treatment outcomes for sinonasal symptoms. They may follow-up sooner, if needed. May consider nasal endoscopy vs. CT sinus at follow-up. Patient is agreeable to this plan, all questions were answered to patient's satisfaction.     At today's visit with Sasha Ceballos, the number of minutes I spent providing patient care was 30. More than 50% of that time was spent counseling the patient on possible etiologies, test results, treatment options and care coordination.     Subjective   HPI: aSsha Ceballos is a 30 y.o. female who presents for the evaluation of bilateral ear fullness.  The patient states that symptom onset began 2 weeks ago. When asked about the presence of hearing loss, ear pain, tinnitus, ear itching, ear drainage, autophony, or vertigo, she admits to none. She also presents today with symptoms of persistent nasal congestion for the past 5 months following URI. She states that she experiences persistent postnasal drainage which is most prominent upon morning wakening. Other associated symptoms include pressure sensation behind the eyes. Reports that nasal drainage appears yellow/green. She denies symptoms of facial pain/pressure, nasal obstruction, epistaxis, loss of smell or taste. She reports that she has been using Flonase nasal spray every other day and performing nasal rinses with no symptom benefit. States that she previously used o.t.c. oral zyrtec but has not been using recently. When asked about pertinent otologic history, the patient denies a history of recurrent ear infections, denies history of ear surgery, denies history of PE tube insertion, and denies history of prolonged/traumatic loud noise exposure. The patient does not endorse a family history of hearing loss. She does endorse habitual bruxism and wears a custom nighttime mouth guard while  asleep. Patient has a recent history of DNS s/p cosmetic rhinoplasty, septoplasty and inferior turbinate reduction (4/5/24) with Dr. Rodriguez.       PATIENT HISTORY:  Past Medical History:   Diagnosis Date    Cervicalgia 12/27/2016    Cervicalgia of [id number] region    Hypothyroidism     Migraine with aura, not intractable, without status migrainosus 12/27/2016    Migraine with aura and without status migrainosus, not intractable    Personal history of other diseases of the respiratory system     History of asthma    Personal history of other endocrine, nutritional and metabolic disease 09/15/2022    History of Hashimoto thyroiditis    Personal history of other specified conditions     History of febrile seizure    Pott's disease       Past Surgical History:   Procedure Laterality Date    CT ANGIO NECK  1/20/2023    CT NECK ANGIO W AND WO IV CONTRAST 1/20/2023 [facility]    CT HEAD ANGIO W AND WO IV CONTRAST  1/20/2023    CT HEAD ANGIO W AND WO IV CONTRAST 1/20/2023 [facility]    FOOT SURGERY  12/27/2016    Foot Surgery    OTHER SURGICAL HISTORY  09/15/2022    Corneal lasik      Allergies   Allergen Reactions    Oxycodone-Acetaminophen Hives    Latex Itching        Current Outpatient Medications:     buPROPion (Wellbutrin) 100 mg tablet, Take 1 tablet (100 mg) by mouth. As directed., Disp: , Rfl:     FLUoxetine (PROzac) 20 mg capsule, Take 2 capsules (40 mg) by mouth once daily., Disp: , Rfl:     levothyroxine (Synthroid, Levoxyl) 100 mcg tablet, Take 1 tablet (100 mcg) by mouth once daily., Disp: , Rfl:     naproxen (Naprosyn) 500 mg tablet, Take 1 tablet (500 mg) by mouth 2 times a day as needed for mild pain (1 - 3)., Disp: , Rfl:     propranolol (Inderal) 10 mg tablet, Take 1 tablet (10 mg) by mouth 2 times a day., Disp: , Rfl:     sodium chloride (Ocean) 0.65 % nasal spray, Administer 1 spray into each nostril if needed for congestion. (Patient not taking: Reported on  "12/26/2024), Disp: 30 mL, Rfl: 12    spironolactone (Aldactone) 100 mg tablet, Take 1 tablet (100 mg) by mouth once daily., Disp: , Rfl:     SUMAtriptan (Imitrex) 100 mg tablet, Take 1 tablet (100 mg) by mouth. FOR MIGRAINE RELIEF. MAY REPEAT 2 HOURS LATER. MAXIMUM 200MG/DAY., Disp: , Rfl:    Tobacco Use: Low Risk  (3/19/2025)    Patient History     Smoking Tobacco Use: Never     Smokeless Tobacco Use: Never     Passive Exposure: Not on file      Alcohol Use: Not on file      Social History     Substance and Sexual Activity   Drug Use Never        Review of Systems   All other systems negative.     Objective   Visit Vitals  /76 (BP Location: Right arm, Patient Position: Sitting, BP Cuff Size: Adult)   Ht 1.727 m (5' 8\")   Wt 58.1 kg (128 lb)   BMI 19.46 kg/m²   OB Status IUD   Smoking Status Never   BSA 1.67 m²        PHYSICAL EXAM:  General appearance: Appears well, well-nourished, well groomed. No acute distress.   Constitutional: No fever, chills, weight loss or weight gain.  Communication: Normal communication  Psychiatric: Oriented to person, place and time. Normal mood and affect.  Neurologic: Cranial nerves II-XII grossly intact and symmetric bilaterally.  Cardiovascular: Examination of peripheral vascular system shows no clubbing or cyanosis.  Respiratory: No respiratory distress increased work of breathing. Inspection of the chest with symmetric chest expansion and normal respiratory effort.  Skin: No head and neck rashes.  Head: Normocephalic. Atraumatic with no masses, lesions or scarring.  Face: Normal symmetry. No scars or deformities.  Eyes: Conjunctiva not edematous or erythematous. PERRLA  Neck: Supple and symmetric, trachea midline. Lymph nodes with no adenopathy.  Head: Normocephalic. Atraumatic with no masses, lesions or scarring.  Eyes: PERRL, EOMI, Conjunctiva is clear. No nystagmus.  Nose: External inspection of nose: No nasal lesions, lacerations or scars. No tenderness on frontal or " maxillary sinus palpation. Anterior rhinoscopy with limited visualization past the inferior turbinates: Septum is midline.  No septal perforation or lesions. No septal hematoma/ seroma.  No signs of bleeding.  No evidence of intranasal polyps.  Inferior turbinates are surgically reduced.    Throat:  Floor of mouth is clear, no masses.  Tongue appears normal, no lesions or masses. Gums, gingiva, buccal mucosa appear pink and moist, no lesions. Teeth are in intact.  No obvious dental infections.  Peritonsillar regions appear symmetric without swelling.  Hard and soft palate appear normal, no obvious cleft. Uvula is midline.  Left Tonsil -- 2+, no exudates.  Right Tonsil -- 2+, no exudates.  Oropharynx: No lesions. Retropharyngeal wall is flat.  No postnasal drip.  Salivary Glands: Symmetric bilaterally.  No palpable masses.  No evidence of acute infection or salivary stones.  TMJ: Moderate TMJ crepitus (left>right)  no trismus.  Right Ear: External inspection of ear with no deformity, scars, or masses. Mastoid is nontender.   External auditory canal is partially impacted with cerumen.   Left Ear: External inspection of ear with no deformity, scars, or masses. Mastoid is nontender.   External auditory canal is clear.  TM is intact with no sign of infection, effusion, or retraction.  No perforation seen. Auto insufflation visible under microscopy.    RESULTS:  I personally reviewed the patient's audiogram from 03/19/25, which revealed the following results: Normal hearing across all frequencies in bilateral ears. Normal tympanogram bilaterally. Excellent speech discrimination scores bilaterally. Acoustic reflexes present right ipsilateral, and present left ipsilateral.     Cassie Durant, APRN-CNP    Spine appears normal, range of motion is not limited, + right lateral distal cervical and posterior shoulder mild tenderness, + bilateral mid paravertebral lumbar tenderness

## 2025-04-10 ENCOUNTER — APPOINTMENT (OUTPATIENT)
Dept: DERMATOLOGY | Facility: CLINIC | Age: 30
End: 2025-04-10
Payer: COMMERCIAL

## 2025-04-30 ENCOUNTER — APPOINTMENT (OUTPATIENT)
Facility: CLINIC | Age: 30
End: 2025-04-30
Payer: MEDICARE

## 2025-07-10 ENCOUNTER — APPOINTMENT (OUTPATIENT)
Dept: DERMATOLOGY | Facility: CLINIC | Age: 30
End: 2025-07-10
Payer: MEDICARE

## (undated) DEVICE — CATHETER, DRAINAGE, NASOGASTRIC, SUMP, SALEM, W/ANTI-REFLUX VALVE, 18 FR, 48 IN

## (undated) DEVICE — Device

## (undated) DEVICE — NEEDLE, HYPODERMIC, REGULAR WALL, REGULAR BEVEL, 18 G X 1.5 IN

## (undated) DEVICE — CONTAINER STERILE SPECIMEN 90ML, STERILE

## (undated) DEVICE — SYRINGE, MONOJECT, LUER LOCK, 3 CC, LF

## (undated) DEVICE — GLOVE, SURGICAL, PROTEXIS PI BLUE W/NEUTHERA, 7.5, PF, LF

## (undated) DEVICE — SYRINGE, 10 CC, LUER LOCK

## (undated) DEVICE — NEEDLE, MICRODISSECTION STR 4CM

## (undated) DEVICE — APPLICATOR, COTTON TIP, 3 IN, WOOD, 10-PACK, STERILE

## (undated) DEVICE — SYRINGE, 20 CC, LUER LOCK, MONOJECT, W/O CAP, LF

## (undated) DEVICE — MARKER, SKIN, REGULAR TIP, W/W/FLEXI RULER, LABEL

## (undated) DEVICE — SPONGE, GAUZE, XRAY DECT, 16 PLY, 4 X 4, W/MASTER DMT,STERILE

## (undated) DEVICE — CLEANER, WIPE, INSTRUMENT, 3.25 X 3.25 IN

## (undated) DEVICE — ADHESIVE, SKIN, MASTISOL, 2/3 CC VIAL

## (undated) DEVICE — STRIP, SKIN CLOSURE, STERI STRIP, REINFORCED, 0.5 X 4 IN